# Patient Record
Sex: MALE | Race: WHITE | NOT HISPANIC OR LATINO | URBAN - METROPOLITAN AREA
[De-identification: names, ages, dates, MRNs, and addresses within clinical notes are randomized per-mention and may not be internally consistent; named-entity substitution may affect disease eponyms.]

---

## 2024-04-02 ENCOUNTER — TELEMEDICINE (OUTPATIENT)
Dept: PRIMARY CARE | Facility: CLINIC | Age: 24
End: 2024-04-02
Payer: COMMERCIAL

## 2024-04-02 DIAGNOSIS — R50.9 FEVER AND CHILLS: ICD-10-CM

## 2024-04-02 DIAGNOSIS — J06.9 UPPER RESPIRATORY TRACT INFECTION, UNSPECIFIED TYPE: Primary | ICD-10-CM

## 2024-04-02 DIAGNOSIS — R05.1 ACUTE COUGH: ICD-10-CM

## 2024-04-02 PROCEDURE — 1036F TOBACCO NON-USER: CPT | Performed by: PHYSICIAN ASSISTANT

## 2024-04-02 PROCEDURE — 99213 OFFICE O/P EST LOW 20 MIN: CPT | Performed by: PHYSICIAN ASSISTANT

## 2024-04-02 RX ORDER — BENZONATATE 100 MG/1
100-200 CAPSULE ORAL 3 TIMES DAILY PRN
Qty: 42 CAPSULE | Refills: 0 | Status: SHIPPED | OUTPATIENT
Start: 2024-04-02 | End: 2024-05-02

## 2024-04-02 ASSESSMENT — ENCOUNTER SYMPTOMS
SORE THROAT: 1
FEVER: 1
ABDOMINAL PAIN: 0
DIARRHEA: 0
WHEEZING: 0
HEADACHES: 1
NAUSEA: 0
DYSURIA: 0
COUGH: 1
VOMITING: 0

## 2024-04-02 NOTE — PROGRESS NOTES
Subjective   Patient ID: Parth Li is a 23 y.o. male who presents for URI (Fever/) and Cough.    Virtual or Telephone Consent    An interactive audio and video telecommunication system which permits real time communications between the patient (at the originating site) and provider (at the distant site) was utilized to provide this telehealth service.   Verbal consent was requested and obtained from Parth Li on this date, 04/02/24 for a telehealth visit.         Fever   This is a new problem. The current episode started in the past 7 days. The problem occurs daily. The problem has been unchanged. The maximum temperature noted was 101 to 101.9 F. The temperature was taken using an oral thermometer. Associated symptoms include congestion, coughing, headaches, muscle aches, sleepiness and a sore throat. Pertinent negatives include no abdominal pain, chest pain, diarrhea, ear pain, nausea, rash, urinary pain, vomiting or wheezing.       Patient c/o cough, nasal discharge, fever, chills, and aches. No shortness of breath. Has scratchy throat.  Getting fevers up to 101.   Present for 3-4 days. Came on fairly quickly.   Cough: Cough is dry and hacky.   Nasal discharge: Described as clear.  Denies associated diarrhea, earache and vomiting.   Taking OTC Tylenol and Mucinex DM.     Patient denies recent known COVID exposure or international travel.  No known flu exposure.   Took home COVID test yesterday that was negative.        reports that he has never smoked. He has never used smokeless tobacco.       Review of Systems   Constitutional:  Positive for fever.   HENT:  Positive for congestion and sore throat. Negative for ear pain.    Respiratory:  Positive for cough. Negative for wheezing.    Cardiovascular:  Negative for chest pain.   Gastrointestinal:  Negative for abdominal pain, diarrhea, nausea and vomiting.   Genitourinary:  Negative for dysuria.   Skin:  Negative for rash.   Neurological:  Positive for  headaches.     History reviewed. No pertinent past medical history.   Family History   Problem Relation Name Age of Onset    Coronary artery disease Father        Social History     Tobacco Use    Smoking status: Never    Smokeless tobacco: Never   Substance Use Topics    Alcohol use: Not Currently    Drug use: Not Currently        Objective   There were no vitals taken for this visit.    Physical Exam  Constitutional:       General: He is not in acute distress.     Appearance: Normal appearance. He is not toxic-appearing.   HENT:      Head: Normocephalic.   Eyes:      General: No scleral icterus.  Pulmonary:      Effort: Pulmonary effort is normal.   Neurological:      Mental Status: He is alert.         Assessment/Plan   Diagnoses and all orders for this visit:  Upper respiratory tract infection, unspecified type  Acute cough  -     benzonatate (Tessalon) 100 mg capsule; Take 1-2 capsules (100-200 mg) by mouth 3 times a day as needed for cough. Do not crush or chew.  Fever and chills       Discussed probable viral nature of illness. Suspect influenza since home COVID test negative.   Can use Mucinex DM 1200mg 2x per day.   Use Tylenol and/or ibuprofen as needed.  Rx Tessalon to use for cough as needed.   Encourage fluids and rest.   Gave work excuse.   Follow up if symptoms increase or persist.  If continuing to get fevers by the end of the week he should be evaluated in-person.       Duration of video visit: 12m 19s

## 2024-04-02 NOTE — LETTER
April 2, 2024     Patient: Parth Li   YOB: 2000   Date of Visit: 4/2/2024       To Whom It May Concern:    Parth Li was seen in my clinic on 4/2/2024 at 1:50 pm. Please excuse Parth for his absence from work on  4/1/24 - 4/3/24.    If you have any questions or concerns, please don't hesitate to call.         Sincerely,         Trey Hall PA-C        CC: No Recipients

## 2024-06-27 ENCOUNTER — APPOINTMENT (OUTPATIENT)
Dept: PRIMARY CARE | Facility: CLINIC | Age: 24
End: 2024-06-27
Payer: COMMERCIAL